# Patient Record
Sex: FEMALE
[De-identification: names, ages, dates, MRNs, and addresses within clinical notes are randomized per-mention and may not be internally consistent; named-entity substitution may affect disease eponyms.]

---

## 2020-12-28 ENCOUNTER — TRANSCRIPTION ENCOUNTER (OUTPATIENT)
Age: 30
End: 2020-12-28

## 2021-01-12 ENCOUNTER — EMERGENCY (EMERGENCY)
Facility: HOSPITAL | Age: 31
LOS: 1 days | Discharge: HOME | End: 2021-01-12
Attending: EMERGENCY MEDICINE | Admitting: EMERGENCY MEDICINE
Payer: COMMERCIAL

## 2021-01-12 ENCOUNTER — EMERGENCY (EMERGENCY)
Facility: HOSPITAL | Age: 31
LOS: 1 days | Discharge: HOME | End: 2021-01-12
Admitting: EMERGENCY MEDICINE
Payer: COMMERCIAL

## 2021-01-12 VITALS
HEART RATE: 69 BPM | OXYGEN SATURATION: 98 % | RESPIRATION RATE: 18 BRPM | SYSTOLIC BLOOD PRESSURE: 131 MMHG | TEMPERATURE: 99 F | DIASTOLIC BLOOD PRESSURE: 87 MMHG

## 2021-01-12 DIAGNOSIS — Z20.822 CONTACT WITH AND (SUSPECTED) EXPOSURE TO COVID-19: ICD-10-CM

## 2021-01-12 PROCEDURE — 99283 EMERGENCY DEPT VISIT LOW MDM: CPT

## 2021-01-12 PROCEDURE — L9981: CPT

## 2021-01-12 NOTE — ED PROVIDER NOTE - PATIENT PORTAL LINK FT
You can access the FollowMyHealth Patient Portal offered by [facility] by registering at the following website: [email or web address]. By joining [facility]’s FollowMyHealth portal, you will also be able to view your health information using other applications (apps) compatible with our system.

## 2021-01-12 NOTE — ED PROVIDER NOTE - NSFOLLOWUPINSTRUCTIONS_ED_ALL_ED_FT
Your test results may take 1-3 days. You will get a text/email.  Please check the patient online portal ([facility] and website) for results. You can create a portal account at [email or web address]. [facility] [place]. If you have old records with [employer] or [facility]  or encounter any difficulties with us you will need to call the HELP line to merge results [phone] (Mon-Fri 8a-5p).    Please follow the instructions on provided coronavirus discharge educational forms and if needed self quarantine for 14 days.     If you test positive for COVID 19:    1. STAY HOME for 14 DAYS  2. Minimize human contact to ONLY ESSENTIAL  3. Every time you wash your hands, sing the HAPPY BIRTHDAY song so you know you're washing long enough.  Make sure to scrub the webspace between your fingers.  4. DRINK 1-3 Liters of fluids day x at least 5 days.  To remain hydrated. Your fatigue, lightheadedness, and body aches will decrease and your fever has a better chance of breaking if you are well hydrated.    5. For your Fever and Body aches takes Tylenol 650-100mg every 4-6h (max 4000mg/day). Try not to use ibuprofen, aspirin or naproxen (Advil, Motrin or Aleve) as these may worsen Coronavirus infection.  6. Use an inhaler for mild shortness of breath and cough  7. RETURN TO THE ER IMMEDIATELY IF YOU HAVE WORSENING SHORTNESS OF BREATH  8. TAKE THE FOLLOWING SUPPLEMENTS DAILY.        VITAMIN C 1000MG ONCE DAILY.        VITAMIN D 200IU ONCE DAILY.        ZINC 50MG ONCE DAILY.

## 2021-01-12 NOTE — ED PROVIDER NOTE - CLINICAL SUMMARY MEDICAL DECISION MAKING FREE TEXT BOX
Asymptomatic patient here for COVID screening.  Risk of exposure is very low.  Reviewed vitals and testing plan with the patient.  Encouraged to download the follow my health shereen for test results.

## 2021-08-27 ENCOUNTER — TRANSCRIPTION ENCOUNTER (OUTPATIENT)
Age: 31
End: 2021-08-27

## 2021-10-05 ENCOUNTER — APPOINTMENT (OUTPATIENT)
Dept: ANTEPARTUM | Facility: CLINIC | Age: 31
End: 2021-10-05
Payer: COMMERCIAL

## 2021-10-05 ENCOUNTER — TRANSCRIPTION ENCOUNTER (OUTPATIENT)
Age: 31
End: 2021-10-05

## 2021-10-05 ENCOUNTER — ASOB RESULT (OUTPATIENT)
Age: 31
End: 2021-10-05

## 2021-10-05 PROBLEM — Z00.00 ENCOUNTER FOR PREVENTIVE HEALTH EXAMINATION: Status: ACTIVE | Noted: 2021-10-05

## 2021-10-05 PROCEDURE — 76813 OB US NUCHAL MEAS 1 GEST: CPT

## 2021-10-05 PROCEDURE — 76801 OB US < 14 WKS SINGLE FETUS: CPT

## 2021-10-13 ENCOUNTER — TRANSCRIPTION ENCOUNTER (OUTPATIENT)
Age: 31
End: 2021-10-13

## 2021-10-29 ENCOUNTER — TRANSCRIPTION ENCOUNTER (OUTPATIENT)
Age: 31
End: 2021-10-29

## 2021-11-24 ENCOUNTER — TRANSCRIPTION ENCOUNTER (OUTPATIENT)
Age: 31
End: 2021-11-24

## 2021-12-03 ENCOUNTER — ASOB RESULT (OUTPATIENT)
Age: 31
End: 2021-12-03

## 2021-12-03 ENCOUNTER — APPOINTMENT (OUTPATIENT)
Dept: ANTEPARTUM | Facility: CLINIC | Age: 31
End: 2021-12-03
Payer: COMMERCIAL

## 2021-12-03 PROCEDURE — 76805 OB US >/= 14 WKS SNGL FETUS: CPT

## 2021-12-03 PROCEDURE — 76817 TRANSVAGINAL US OBSTETRIC: CPT

## 2021-12-09 ENCOUNTER — TRANSCRIPTION ENCOUNTER (OUTPATIENT)
Age: 31
End: 2021-12-09

## 2022-01-28 ENCOUNTER — APPOINTMENT (OUTPATIENT)
Dept: ANTEPARTUM | Facility: CLINIC | Age: 32
End: 2022-01-28
Payer: COMMERCIAL

## 2022-01-28 ENCOUNTER — ASOB RESULT (OUTPATIENT)
Age: 32
End: 2022-01-28

## 2022-01-28 PROCEDURE — 76819 FETAL BIOPHYS PROFIL W/O NST: CPT

## 2022-01-28 PROCEDURE — 76816 OB US FOLLOW-UP PER FETUS: CPT

## 2022-02-25 ENCOUNTER — APPOINTMENT (OUTPATIENT)
Dept: ANTEPARTUM | Facility: CLINIC | Age: 32
End: 2022-02-25
Payer: COMMERCIAL

## 2022-02-25 ENCOUNTER — ASOB RESULT (OUTPATIENT)
Age: 32
End: 2022-02-25

## 2022-02-25 PROCEDURE — 76819 FETAL BIOPHYS PROFIL W/O NST: CPT

## 2022-02-25 PROCEDURE — 76816 OB US FOLLOW-UP PER FETUS: CPT

## 2022-03-10 RX ADMIN — Medication 30 MILLIGRAM(S): at 10:30

## 2022-03-11 ENCOUNTER — APPOINTMENT (OUTPATIENT)
Dept: ANTEPARTUM | Facility: CLINIC | Age: 32
End: 2022-03-11
Payer: COMMERCIAL

## 2022-03-11 ENCOUNTER — ASOB RESULT (OUTPATIENT)
Age: 32
End: 2022-03-11

## 2022-03-11 PROCEDURE — 76816 OB US FOLLOW-UP PER FETUS: CPT

## 2022-03-11 PROCEDURE — 76819 FETAL BIOPHYS PROFIL W/O NST: CPT

## 2022-03-15 ENCOUNTER — ASOB RESULT (OUTPATIENT)
Age: 32
End: 2022-03-15

## 2022-03-15 ENCOUNTER — APPOINTMENT (OUTPATIENT)
Dept: ANTEPARTUM | Facility: CLINIC | Age: 32
End: 2022-03-15
Payer: COMMERCIAL

## 2022-03-15 PROCEDURE — 76818 FETAL BIOPHYS PROFILE W/NST: CPT

## 2022-03-15 PROCEDURE — 76816 OB US FOLLOW-UP PER FETUS: CPT

## 2022-03-18 ENCOUNTER — APPOINTMENT (OUTPATIENT)
Dept: ANTEPARTUM | Facility: CLINIC | Age: 32
End: 2022-03-18
Payer: COMMERCIAL

## 2022-03-18 ENCOUNTER — ASOB RESULT (OUTPATIENT)
Age: 32
End: 2022-03-18

## 2022-03-18 PROCEDURE — 76819 FETAL BIOPHYS PROFIL W/O NST: CPT

## 2022-03-18 PROCEDURE — 76816 OB US FOLLOW-UP PER FETUS: CPT

## 2022-03-21 ENCOUNTER — ASOB RESULT (OUTPATIENT)
Age: 32
End: 2022-03-21

## 2022-03-21 ENCOUNTER — APPOINTMENT (OUTPATIENT)
Dept: ANTEPARTUM | Facility: CLINIC | Age: 32
End: 2022-03-21
Payer: COMMERCIAL

## 2022-03-21 PROCEDURE — 76818 FETAL BIOPHYS PROFILE W/NST: CPT

## 2022-03-24 ENCOUNTER — ASOB RESULT (OUTPATIENT)
Age: 32
End: 2022-03-24

## 2022-03-24 ENCOUNTER — APPOINTMENT (OUTPATIENT)
Dept: ANTEPARTUM | Facility: CLINIC | Age: 32
End: 2022-03-24
Payer: COMMERCIAL

## 2022-03-24 PROCEDURE — 76818 FETAL BIOPHYS PROFILE W/NST: CPT

## 2022-03-28 ENCOUNTER — APPOINTMENT (OUTPATIENT)
Dept: ANTEPARTUM | Facility: CLINIC | Age: 32
End: 2022-03-28
Payer: COMMERCIAL

## 2022-03-28 ENCOUNTER — ASOB RESULT (OUTPATIENT)
Age: 32
End: 2022-03-28

## 2022-03-28 ENCOUNTER — OUTPATIENT (OUTPATIENT)
Dept: OUTPATIENT SERVICES | Facility: HOSPITAL | Age: 32
LOS: 1 days | End: 2022-03-28
Payer: COMMERCIAL

## 2022-03-28 DIAGNOSIS — Z01.818 ENCOUNTER FOR OTHER PREPROCEDURAL EXAMINATION: ICD-10-CM

## 2022-03-28 LAB
ALBUMIN SERPL ELPH-MCNC: 3.9 G/DL — SIGNIFICANT CHANGE UP (ref 3.3–5)
ALP SERPL-CCNC: 165 U/L — HIGH (ref 40–120)
ALT FLD-CCNC: 22 U/L — SIGNIFICANT CHANGE UP (ref 10–45)
ANION GAP SERPL CALC-SCNC: 12 MMOL/L — SIGNIFICANT CHANGE UP (ref 5–17)
AST SERPL-CCNC: 25 U/L — SIGNIFICANT CHANGE UP (ref 10–40)
BILIRUB SERPL-MCNC: <0.2 MG/DL — SIGNIFICANT CHANGE UP (ref 0.2–1.2)
BLD GP AB SCN SERPL QL: NEGATIVE — SIGNIFICANT CHANGE UP
BLD GP AB SCN SERPL QL: NEGATIVE — SIGNIFICANT CHANGE UP
BUN SERPL-MCNC: 11 MG/DL — SIGNIFICANT CHANGE UP (ref 7–23)
CALCIUM SERPL-MCNC: 9.6 MG/DL — SIGNIFICANT CHANGE UP (ref 8.4–10.5)
CHLORIDE SERPL-SCNC: 102 MMOL/L — SIGNIFICANT CHANGE UP (ref 96–108)
CO2 SERPL-SCNC: 21 MMOL/L — LOW (ref 22–31)
CREAT SERPL-MCNC: 0.57 MG/DL — SIGNIFICANT CHANGE UP (ref 0.5–1.3)
EGFR: 123 ML/MIN/1.73M2 — SIGNIFICANT CHANGE UP
GLUCOSE SERPL-MCNC: 98 MG/DL — SIGNIFICANT CHANGE UP (ref 70–99)
HCT VFR BLD CALC: 36.5 % — SIGNIFICANT CHANGE UP (ref 34.5–45)
HGB BLD-MCNC: 12.6 G/DL — SIGNIFICANT CHANGE UP (ref 11.5–15.5)
MCHC RBC-ENTMCNC: 30.3 PG — SIGNIFICANT CHANGE UP (ref 27–34)
MCHC RBC-ENTMCNC: 34.5 GM/DL — SIGNIFICANT CHANGE UP (ref 32–36)
MCV RBC AUTO: 87.7 FL — SIGNIFICANT CHANGE UP (ref 80–100)
NRBC # BLD: 0 /100 WBCS — SIGNIFICANT CHANGE UP (ref 0–0)
PLATELET # BLD AUTO: 318 K/UL — SIGNIFICANT CHANGE UP (ref 150–400)
POTASSIUM SERPL-MCNC: 4.7 MMOL/L — SIGNIFICANT CHANGE UP (ref 3.5–5.3)
POTASSIUM SERPL-SCNC: 4.7 MMOL/L — SIGNIFICANT CHANGE UP (ref 3.5–5.3)
PROT SERPL-MCNC: 6.3 G/DL — SIGNIFICANT CHANGE UP (ref 6–8.3)
RBC # BLD: 4.16 M/UL — SIGNIFICANT CHANGE UP (ref 3.8–5.2)
RBC # FLD: 12.7 % — SIGNIFICANT CHANGE UP (ref 10.3–14.5)
RH IG SCN BLD-IMP: POSITIVE — SIGNIFICANT CHANGE UP
RH IG SCN BLD-IMP: POSITIVE — SIGNIFICANT CHANGE UP
SODIUM SERPL-SCNC: 135 MMOL/L — SIGNIFICANT CHANGE UP (ref 135–145)
T PALLIDUM AB TITR SER: NEGATIVE — SIGNIFICANT CHANGE UP
WBC # BLD: 11.9 K/UL — HIGH (ref 3.8–10.5)
WBC # FLD AUTO: 11.9 K/UL — HIGH (ref 3.8–10.5)

## 2022-03-28 PROCEDURE — 76818 FETAL BIOPHYS PROFILE W/NST: CPT

## 2022-03-29 ENCOUNTER — TRANSCRIPTION ENCOUNTER (OUTPATIENT)
Age: 32
End: 2022-03-29

## 2022-03-30 ENCOUNTER — RESULT REVIEW (OUTPATIENT)
Age: 32
End: 2022-03-30

## 2022-03-30 ENCOUNTER — INPATIENT (INPATIENT)
Facility: HOSPITAL | Age: 32
LOS: 1 days | Discharge: HOME | End: 2022-04-01
Attending: OBSTETRICS & GYNECOLOGY | Admitting: OBSTETRICS & GYNECOLOGY
Payer: COMMERCIAL

## 2022-03-30 VITALS — HEIGHT: 63 IN | WEIGHT: 202.38 LBS

## 2022-03-30 LAB
ALBUMIN SERPL ELPH-MCNC: 3.8 G/DL — SIGNIFICANT CHANGE UP (ref 3.3–5)
ALP SERPL-CCNC: 159 U/L — HIGH (ref 40–120)
ALT FLD-CCNC: 21 U/L — SIGNIFICANT CHANGE UP (ref 10–45)
ANION GAP SERPL CALC-SCNC: 12 MMOL/L — SIGNIFICANT CHANGE UP (ref 5–17)
APPEARANCE UR: CLEAR — SIGNIFICANT CHANGE UP
APTT BLD: 26.6 SEC — LOW (ref 27.5–35.5)
AST SERPL-CCNC: 25 U/L — SIGNIFICANT CHANGE UP (ref 10–40)
BACTERIA # UR AUTO: PRESENT /HPF
BASOPHILS # BLD AUTO: 0.02 K/UL — SIGNIFICANT CHANGE UP (ref 0–0.2)
BASOPHILS NFR BLD AUTO: 0.2 % — SIGNIFICANT CHANGE UP (ref 0–2)
BILIRUB SERPL-MCNC: 0.2 MG/DL — SIGNIFICANT CHANGE UP (ref 0.2–1.2)
BILIRUB UR-MCNC: NEGATIVE — SIGNIFICANT CHANGE UP
BLD GP AB SCN SERPL QL: NEGATIVE — SIGNIFICANT CHANGE UP
BUN SERPL-MCNC: 9 MG/DL — SIGNIFICANT CHANGE UP (ref 7–23)
CALCIUM SERPL-MCNC: 9.5 MG/DL — SIGNIFICANT CHANGE UP (ref 8.4–10.5)
CHLORIDE SERPL-SCNC: 102 MMOL/L — SIGNIFICANT CHANGE UP (ref 96–108)
CO2 SERPL-SCNC: 19 MMOL/L — LOW (ref 22–31)
COLOR SPEC: YELLOW — SIGNIFICANT CHANGE UP
COVID-19 SPIKE DOMAIN AB INTERP: POSITIVE
COVID-19 SPIKE DOMAIN ANTIBODY RESULT: >250 U/ML — HIGH
CREAT ?TM UR-MCNC: 30 MG/DL — SIGNIFICANT CHANGE UP
CREAT SERPL-MCNC: 0.53 MG/DL — SIGNIFICANT CHANGE UP (ref 0.5–1.3)
DIFF PNL FLD: ABNORMAL
EGFR: 125 ML/MIN/1.73M2 — SIGNIFICANT CHANGE UP
EOSINOPHIL # BLD AUTO: 0 K/UL — SIGNIFICANT CHANGE UP (ref 0–0.5)
EOSINOPHIL NFR BLD AUTO: 0 % — SIGNIFICANT CHANGE UP (ref 0–6)
EPI CELLS # UR: SIGNIFICANT CHANGE UP /HPF (ref 0–5)
FIBRINOGEN PPP-MCNC: 436 MG/DL — SIGNIFICANT CHANGE UP (ref 258–438)
GLUCOSE SERPL-MCNC: 94 MG/DL — SIGNIFICANT CHANGE UP (ref 70–99)
GLUCOSE UR QL: NEGATIVE — SIGNIFICANT CHANGE UP
HCT VFR BLD CALC: 34.9 % — SIGNIFICANT CHANGE UP (ref 34.5–45)
HGB BLD-MCNC: 11.9 G/DL — SIGNIFICANT CHANGE UP (ref 11.5–15.5)
IMM GRANULOCYTES NFR BLD AUTO: 0.8 % — SIGNIFICANT CHANGE UP (ref 0–1.5)
INR BLD: 0.91 — SIGNIFICANT CHANGE UP (ref 0.88–1.16)
KETONES UR-MCNC: ABNORMAL MG/DL
LDH SERPL L TO P-CCNC: 257 U/L — HIGH (ref 50–242)
LEUKOCYTE ESTERASE UR-ACNC: NEGATIVE — SIGNIFICANT CHANGE UP
LYMPHOCYTES # BLD AUTO: 1.7 K/UL — SIGNIFICANT CHANGE UP (ref 1–3.3)
LYMPHOCYTES # BLD AUTO: 14.3 % — SIGNIFICANT CHANGE UP (ref 13–44)
MCHC RBC-ENTMCNC: 29.2 PG — SIGNIFICANT CHANGE UP (ref 27–34)
MCHC RBC-ENTMCNC: 34.1 GM/DL — SIGNIFICANT CHANGE UP (ref 32–36)
MCV RBC AUTO: 85.7 FL — SIGNIFICANT CHANGE UP (ref 80–100)
MONOCYTES # BLD AUTO: 0.76 K/UL — SIGNIFICANT CHANGE UP (ref 0–0.9)
MONOCYTES NFR BLD AUTO: 6.4 % — SIGNIFICANT CHANGE UP (ref 2–14)
NEUTROPHILS # BLD AUTO: 9.28 K/UL — HIGH (ref 1.8–7.4)
NEUTROPHILS NFR BLD AUTO: 78.3 % — HIGH (ref 43–77)
NITRITE UR-MCNC: NEGATIVE — SIGNIFICANT CHANGE UP
NRBC # BLD: 0 /100 WBCS — SIGNIFICANT CHANGE UP (ref 0–0)
PH UR: 7.5 — SIGNIFICANT CHANGE UP (ref 5–8)
PLATELET # BLD AUTO: 278 K/UL — SIGNIFICANT CHANGE UP (ref 150–400)
POTASSIUM SERPL-MCNC: 4 MMOL/L — SIGNIFICANT CHANGE UP (ref 3.5–5.3)
POTASSIUM SERPL-SCNC: 4 MMOL/L — SIGNIFICANT CHANGE UP (ref 3.5–5.3)
PROT ?TM UR-MCNC: 11 MG/DL — SIGNIFICANT CHANGE UP (ref 0–12)
PROT SERPL-MCNC: 6.7 G/DL — SIGNIFICANT CHANGE UP (ref 6–8.3)
PROT UR-MCNC: NEGATIVE MG/DL — SIGNIFICANT CHANGE UP
PROT/CREAT UR-RTO: 0.4 RATIO — HIGH (ref 0–0.2)
PROTHROM AB SERPL-ACNC: 10.8 SEC — SIGNIFICANT CHANGE UP (ref 10.5–13.4)
RBC # BLD: 4.07 M/UL — SIGNIFICANT CHANGE UP (ref 3.8–5.2)
RBC # FLD: 12.5 % — SIGNIFICANT CHANGE UP (ref 10.3–14.5)
RBC CASTS # UR COMP ASSIST: > 10 /HPF
RH IG SCN BLD-IMP: POSITIVE — SIGNIFICANT CHANGE UP
SARS-COV-2 IGG+IGM SERPL QL IA: >250 U/ML — HIGH
SARS-COV-2 IGG+IGM SERPL QL IA: POSITIVE
SODIUM SERPL-SCNC: 133 MMOL/L — LOW (ref 135–145)
SP GR SPEC: 1.02 — SIGNIFICANT CHANGE UP (ref 1–1.03)
T PALLIDUM AB TITR SER: NEGATIVE — SIGNIFICANT CHANGE UP
URATE SERPL-MCNC: 6.1 MG/DL — SIGNIFICANT CHANGE UP (ref 2.5–7)
UROBILINOGEN FLD QL: 0.2 E.U./DL — SIGNIFICANT CHANGE UP
WBC # BLD: 11.86 K/UL — HIGH (ref 3.8–10.5)
WBC # FLD AUTO: 11.86 K/UL — HIGH (ref 3.8–10.5)
WBC UR QL: < 5 /HPF — SIGNIFICANT CHANGE UP

## 2022-03-30 PROCEDURE — 88307 TISSUE EXAM BY PATHOLOGIST: CPT | Mod: 26

## 2022-03-30 RX ORDER — SIMETHICONE 80 MG/1
80 TABLET, CHEWABLE ORAL EVERY 4 HOURS
Refills: 0 | Status: DISCONTINUED | OUTPATIENT
Start: 2022-03-30 | End: 2022-04-01

## 2022-03-30 RX ORDER — DEXAMETHASONE 0.5 MG/5ML
4 ELIXIR ORAL EVERY 6 HOURS
Refills: 0 | Status: DISCONTINUED | OUTPATIENT
Start: 2022-03-30 | End: 2022-03-30

## 2022-03-30 RX ORDER — IBUPROFEN 200 MG
600 TABLET ORAL EVERY 6 HOURS
Refills: 0 | Status: COMPLETED | OUTPATIENT
Start: 2022-03-30 | End: 2023-02-26

## 2022-03-30 RX ORDER — SODIUM CHLORIDE 9 MG/ML
1000 INJECTION, SOLUTION INTRAVENOUS
Refills: 0 | Status: DISCONTINUED | OUTPATIENT
Start: 2022-03-30 | End: 2022-03-30

## 2022-03-30 RX ORDER — OXYTOCIN 10 UNIT/ML
333.33 VIAL (ML) INJECTION
Qty: 20 | Refills: 0 | Status: DISCONTINUED | OUTPATIENT
Start: 2022-03-30 | End: 2022-04-01

## 2022-03-30 RX ORDER — LANOLIN
1 OINTMENT (GRAM) TOPICAL EVERY 6 HOURS
Refills: 0 | Status: DISCONTINUED | OUTPATIENT
Start: 2022-03-30 | End: 2022-04-01

## 2022-03-30 RX ORDER — CEFAZOLIN SODIUM 1 G
2000 VIAL (EA) INJECTION ONCE
Refills: 0 | Status: COMPLETED | OUTPATIENT
Start: 2022-03-30 | End: 2022-03-30

## 2022-03-30 RX ORDER — FAMOTIDINE 10 MG/ML
20 INJECTION INTRAVENOUS ONCE
Refills: 0 | Status: COMPLETED | OUTPATIENT
Start: 2022-03-30 | End: 2022-03-30

## 2022-03-30 RX ORDER — ENOXAPARIN SODIUM 100 MG/ML
40 INJECTION SUBCUTANEOUS EVERY 12 HOURS
Refills: 0 | Status: DISCONTINUED | OUTPATIENT
Start: 2022-03-31 | End: 2022-04-01

## 2022-03-30 RX ORDER — OXYCODONE HYDROCHLORIDE 5 MG/1
5 TABLET ORAL ONCE
Refills: 0 | Status: DISCONTINUED | OUTPATIENT
Start: 2022-03-30 | End: 2022-04-01

## 2022-03-30 RX ORDER — SODIUM CHLORIDE 9 MG/ML
1000 INJECTION, SOLUTION INTRAVENOUS
Refills: 0 | Status: DISCONTINUED | OUTPATIENT
Start: 2022-03-30 | End: 2022-04-01

## 2022-03-30 RX ORDER — NALOXONE HYDROCHLORIDE 4 MG/.1ML
0.1 SPRAY NASAL
Refills: 0 | Status: DISCONTINUED | OUTPATIENT
Start: 2022-03-30 | End: 2022-03-30

## 2022-03-30 RX ORDER — SODIUM CHLORIDE 9 MG/ML
1000 INJECTION, SOLUTION INTRAVENOUS ONCE
Refills: 0 | Status: COMPLETED | OUTPATIENT
Start: 2022-03-30 | End: 2022-03-30

## 2022-03-30 RX ORDER — OXYTOCIN 10 UNIT/ML
333.33 VIAL (ML) INJECTION
Qty: 20 | Refills: 0 | Status: DISCONTINUED | OUTPATIENT
Start: 2022-03-30 | End: 2022-03-30

## 2022-03-30 RX ORDER — ACETAMINOPHEN 500 MG
975 TABLET ORAL
Refills: 0 | Status: DISCONTINUED | OUTPATIENT
Start: 2022-03-30 | End: 2022-04-01

## 2022-03-30 RX ORDER — DIPHENHYDRAMINE HCL 50 MG
25 CAPSULE ORAL EVERY 6 HOURS
Refills: 0 | Status: DISCONTINUED | OUTPATIENT
Start: 2022-03-30 | End: 2022-04-01

## 2022-03-30 RX ORDER — CITRIC ACID/SODIUM CITRATE 300-500 MG
30 SOLUTION, ORAL ORAL ONCE
Refills: 0 | Status: COMPLETED | OUTPATIENT
Start: 2022-03-30 | End: 2022-03-30

## 2022-03-30 RX ORDER — MAGNESIUM HYDROXIDE 400 MG/1
30 TABLET, CHEWABLE ORAL
Refills: 0 | Status: DISCONTINUED | OUTPATIENT
Start: 2022-03-30 | End: 2022-04-01

## 2022-03-30 RX ORDER — OXYCODONE HYDROCHLORIDE 5 MG/1
5 TABLET ORAL
Refills: 0 | Status: DISCONTINUED | OUTPATIENT
Start: 2022-03-30 | End: 2022-04-01

## 2022-03-30 RX ORDER — KETOROLAC TROMETHAMINE 30 MG/ML
30 SYRINGE (ML) INJECTION EVERY 6 HOURS
Refills: 0 | Status: DISCONTINUED | OUTPATIENT
Start: 2022-03-30 | End: 2022-03-31

## 2022-03-30 RX ORDER — ONDANSETRON 8 MG/1
4 TABLET, FILM COATED ORAL EVERY 6 HOURS
Refills: 0 | Status: DISCONTINUED | OUTPATIENT
Start: 2022-03-30 | End: 2022-03-30

## 2022-03-30 RX ORDER — TETANUS TOXOID, REDUCED DIPHTHERIA TOXOID AND ACELLULAR PERTUSSIS VACCINE, ADSORBED 5; 2.5; 8; 8; 2.5 [IU]/.5ML; [IU]/.5ML; UG/.5ML; UG/.5ML; UG/.5ML
0.5 SUSPENSION INTRAMUSCULAR ONCE
Refills: 0 | Status: DISCONTINUED | OUTPATIENT
Start: 2022-03-30 | End: 2022-04-01

## 2022-03-30 RX ADMIN — Medication 100 MILLIGRAM(S): at 11:10

## 2022-03-30 RX ADMIN — Medication 30 MILLIGRAM(S): at 21:37

## 2022-03-30 RX ADMIN — Medication 975 MILLIGRAM(S): at 23:52

## 2022-03-30 RX ADMIN — FAMOTIDINE 20 MILLIGRAM(S): 10 INJECTION INTRAVENOUS at 11:15

## 2022-03-30 RX ADMIN — SIMETHICONE 80 MILLIGRAM(S): 80 TABLET, CHEWABLE ORAL at 21:37

## 2022-03-30 RX ADMIN — Medication 30 MILLIGRAM(S): at 22:30

## 2022-03-30 RX ADMIN — Medication 1000 MILLIUNIT(S)/MIN: at 12:05

## 2022-03-30 RX ADMIN — SODIUM CHLORIDE 2000 MILLILITER(S): 9 INJECTION, SOLUTION INTRAVENOUS at 10:00

## 2022-03-30 RX ADMIN — Medication 30 MILLIGRAM(S): at 16:15

## 2022-03-30 RX ADMIN — SODIUM CHLORIDE 125 MILLILITER(S): 9 INJECTION, SOLUTION INTRAVENOUS at 21:46

## 2022-03-30 RX ADMIN — Medication 30 MILLILITER(S): at 11:17

## 2022-03-30 RX ADMIN — Medication 975 MILLIGRAM(S): at 19:30

## 2022-03-30 RX ADMIN — Medication 30 MILLIGRAM(S): at 15:36

## 2022-03-30 RX ADMIN — Medication 975 MILLIGRAM(S): at 18:48

## 2022-03-30 NOTE — LACTATION INITIAL EVALUATION - NS LACT CON REASON FOR REQ
37 wk gestation baby girl, seen around 6 hrs of life. Voided x2, still dtm. Baby mucousy, has not yet latched to breast. Baby placed skin to skin on mothers chest, burps offered. Mother with large breasts, right nipple everted, left nipple flat/ short. Attempts were made to latch baby to left side but baby pushing away unable to sustain. Hand pump given to help roberto left nipple prior to latch attempts. Manual expression technique and spoon feeding was taught with proper return demo. Colostrum easily expressible and 1 full tsp collected and slowly fed to baby by myself and the mother, then baby placed back to breastfeed. After several attempts and with full assist, baby was finally able to deeply latch to the right breast in football hold, fed ~5 minutes with an organized, nutritive suck, then released breast crying, relatched again for another 2 minutes and fell asleep. Mother to continue with frequent feeds, and manually express left breast and spoon feed to baby if baby continues to have difficulty latching to that side. Mother made aware of lactation availability and to call for further assistance as needed. Increased SSC and rooming-in encouraged. All questions were answered, mother verbalized understanding of teaching and info given. Referral for lactation telehealth was placed for further support s/p d/c./primaparous mom

## 2022-03-30 NOTE — LACTATION INITIAL EVALUATION - LACTATION INTERVENTIONS
initiate/review safe skin-to-skin/initiate/review hand expression/initiate/review techniques for position and latch/post discharge community resources provided/initiate/review finger suck/reviewed components of an effective feeding and at least 8 effective feedings per day required/reviewed importance of monitoring infant diapers, the breastfeeding log, and minimum output each day/reviewed benefits and recommendations for rooming in/reviewed feeding on demand/by cue at least 8 times a day

## 2022-03-30 NOTE — PATIENT PROFILE OB - FALL HARM RISK - UNIVERSAL INTERVENTIONS
Bed in lowest position, wheels locked, appropriate side rails in place/Instruct patient to call for assistance before getting out of bed or chair/Non-slip footwear when patient is out of bed/[place]er to call system/Physically safe environment - no spills, clutter or unnecessary equipment/Purposeful Proactive Rounding/Room/bathroom lighting operational, light cord in reach

## 2022-03-31 LAB
ALBUMIN SERPL ELPH-MCNC: 2.9 G/DL — LOW (ref 3.3–5)
ALP SERPL-CCNC: 112 U/L — SIGNIFICANT CHANGE UP (ref 40–120)
ALT FLD-CCNC: 15 U/L — SIGNIFICANT CHANGE UP (ref 10–45)
ANION GAP SERPL CALC-SCNC: 10 MMOL/L — SIGNIFICANT CHANGE UP (ref 5–17)
AST SERPL-CCNC: 25 U/L — SIGNIFICANT CHANGE UP (ref 10–40)
BASOPHILS # BLD AUTO: 0.02 K/UL — SIGNIFICANT CHANGE UP (ref 0–0.2)
BASOPHILS NFR BLD AUTO: 0.1 % — SIGNIFICANT CHANGE UP (ref 0–2)
BILIRUB SERPL-MCNC: 0.2 MG/DL — SIGNIFICANT CHANGE UP (ref 0.2–1.2)
BUN SERPL-MCNC: 8 MG/DL — SIGNIFICANT CHANGE UP (ref 7–23)
CALCIUM SERPL-MCNC: 9 MG/DL — SIGNIFICANT CHANGE UP (ref 8.4–10.5)
CHLORIDE SERPL-SCNC: 105 MMOL/L — SIGNIFICANT CHANGE UP (ref 96–108)
CO2 SERPL-SCNC: 21 MMOL/L — LOW (ref 22–31)
CREAT SERPL-MCNC: 0.59 MG/DL — SIGNIFICANT CHANGE UP (ref 0.5–1.3)
EGFR: 122 ML/MIN/1.73M2 — SIGNIFICANT CHANGE UP
EOSINOPHIL # BLD AUTO: 0 K/UL — SIGNIFICANT CHANGE UP (ref 0–0.5)
EOSINOPHIL NFR BLD AUTO: 0 % — SIGNIFICANT CHANGE UP (ref 0–6)
GLUCOSE SERPL-MCNC: 113 MG/DL — HIGH (ref 70–99)
HCT VFR BLD CALC: 28.2 % — LOW (ref 34.5–45)
HGB BLD-MCNC: 9.7 G/DL — LOW (ref 11.5–15.5)
IMM GRANULOCYTES NFR BLD AUTO: 0.5 % — SIGNIFICANT CHANGE UP (ref 0–1.5)
LYMPHOCYTES # BLD AUTO: 1.93 K/UL — SIGNIFICANT CHANGE UP (ref 1–3.3)
LYMPHOCYTES # BLD AUTO: 12.3 % — LOW (ref 13–44)
MCHC RBC-ENTMCNC: 29.6 PG — SIGNIFICANT CHANGE UP (ref 27–34)
MCHC RBC-ENTMCNC: 34.4 GM/DL — SIGNIFICANT CHANGE UP (ref 32–36)
MCV RBC AUTO: 86 FL — SIGNIFICANT CHANGE UP (ref 80–100)
MONOCYTES # BLD AUTO: 1.35 K/UL — HIGH (ref 0–0.9)
MONOCYTES NFR BLD AUTO: 8.6 % — SIGNIFICANT CHANGE UP (ref 2–14)
NEUTROPHILS # BLD AUTO: 12.34 K/UL — HIGH (ref 1.8–7.4)
NEUTROPHILS NFR BLD AUTO: 78.5 % — HIGH (ref 43–77)
NRBC # BLD: 0 /100 WBCS — SIGNIFICANT CHANGE UP (ref 0–0)
PLATELET # BLD AUTO: 225 K/UL — SIGNIFICANT CHANGE UP (ref 150–400)
POTASSIUM SERPL-MCNC: 3.7 MMOL/L — SIGNIFICANT CHANGE UP (ref 3.5–5.3)
POTASSIUM SERPL-SCNC: 3.7 MMOL/L — SIGNIFICANT CHANGE UP (ref 3.5–5.3)
PROT SERPL-MCNC: 5.5 G/DL — LOW (ref 6–8.3)
RBC # BLD: 3.28 M/UL — LOW (ref 3.8–5.2)
RBC # FLD: 12.4 % — SIGNIFICANT CHANGE UP (ref 10.3–14.5)
SODIUM SERPL-SCNC: 136 MMOL/L — SIGNIFICANT CHANGE UP (ref 135–145)
WBC # BLD: 15.72 K/UL — HIGH (ref 3.8–10.5)
WBC # FLD AUTO: 15.72 K/UL — HIGH (ref 3.8–10.5)

## 2022-03-31 RX ORDER — IBUPROFEN 200 MG
600 TABLET ORAL EVERY 6 HOURS
Refills: 0 | Status: DISCONTINUED | OUTPATIENT
Start: 2022-03-31 | End: 2022-04-01

## 2022-03-31 RX ADMIN — Medication 600 MILLIGRAM(S): at 22:29

## 2022-03-31 RX ADMIN — Medication 975 MILLIGRAM(S): at 18:09

## 2022-03-31 RX ADMIN — SIMETHICONE 80 MILLIGRAM(S): 80 TABLET, CHEWABLE ORAL at 18:09

## 2022-03-31 RX ADMIN — Medication 30 MILLIGRAM(S): at 09:31

## 2022-03-31 RX ADMIN — Medication 975 MILLIGRAM(S): at 12:12

## 2022-03-31 RX ADMIN — Medication 975 MILLIGRAM(S): at 18:39

## 2022-03-31 RX ADMIN — Medication 975 MILLIGRAM(S): at 00:45

## 2022-03-31 RX ADMIN — Medication 30 MILLIGRAM(S): at 03:38

## 2022-03-31 RX ADMIN — Medication 975 MILLIGRAM(S): at 06:00

## 2022-03-31 RX ADMIN — Medication 1 APPLICATION(S): at 09:30

## 2022-03-31 RX ADMIN — Medication 975 MILLIGRAM(S): at 07:00

## 2022-03-31 RX ADMIN — ENOXAPARIN SODIUM 40 MILLIGRAM(S): 100 INJECTION SUBCUTANEOUS at 18:09

## 2022-03-31 RX ADMIN — Medication 600 MILLIGRAM(S): at 21:48

## 2022-03-31 RX ADMIN — ENOXAPARIN SODIUM 40 MILLIGRAM(S): 100 INJECTION SUBCUTANEOUS at 05:59

## 2022-03-31 RX ADMIN — Medication 975 MILLIGRAM(S): at 12:45

## 2022-03-31 RX ADMIN — Medication 30 MILLIGRAM(S): at 04:13

## 2022-04-01 ENCOUNTER — TRANSCRIPTION ENCOUNTER (OUTPATIENT)
Age: 32
End: 2022-04-01

## 2022-04-01 VITALS
RESPIRATION RATE: 18 BRPM | DIASTOLIC BLOOD PRESSURE: 80 MMHG | SYSTOLIC BLOOD PRESSURE: 126 MMHG | OXYGEN SATURATION: 98 % | HEART RATE: 78 BPM | TEMPERATURE: 99 F

## 2022-04-01 RX ORDER — ACETAMINOPHEN 500 MG
3 TABLET ORAL
Qty: 0 | Refills: 0 | DISCHARGE
Start: 2022-04-01

## 2022-04-01 RX ORDER — IBUPROFEN 200 MG
1 TABLET ORAL
Qty: 0 | Refills: 0 | DISCHARGE
Start: 2022-04-01

## 2022-04-01 RX ADMIN — ENOXAPARIN SODIUM 40 MILLIGRAM(S): 100 INJECTION SUBCUTANEOUS at 06:37

## 2022-04-01 RX ADMIN — Medication 975 MILLIGRAM(S): at 07:10

## 2022-04-01 RX ADMIN — Medication 975 MILLIGRAM(S): at 00:47

## 2022-04-01 RX ADMIN — Medication 600 MILLIGRAM(S): at 08:38

## 2022-04-01 RX ADMIN — Medication 600 MILLIGRAM(S): at 04:00

## 2022-04-01 RX ADMIN — SIMETHICONE 80 MILLIGRAM(S): 80 TABLET, CHEWABLE ORAL at 00:47

## 2022-04-01 RX ADMIN — Medication 600 MILLIGRAM(S): at 03:09

## 2022-04-01 RX ADMIN — Medication 600 MILLIGRAM(S): at 09:30

## 2022-04-01 RX ADMIN — SIMETHICONE 80 MILLIGRAM(S): 80 TABLET, CHEWABLE ORAL at 06:52

## 2022-04-01 RX ADMIN — Medication 975 MILLIGRAM(S): at 11:45

## 2022-04-01 RX ADMIN — MAGNESIUM HYDROXIDE 30 MILLILITER(S): 400 TABLET, CHEWABLE ORAL at 06:52

## 2022-04-01 RX ADMIN — Medication 975 MILLIGRAM(S): at 01:30

## 2022-04-01 RX ADMIN — Medication 975 MILLIGRAM(S): at 06:37

## 2022-04-01 NOTE — DISCHARGE NOTE OB - NS MD DC FALL RISK RISK
For information on Fall & Injury Prevention, visit: [email or web address] OR  [email or web address] OR  https://www.cdc.gov/steadi/patient.html

## 2022-04-01 NOTE — DISCHARGE NOTE OB - PATIENT PORTAL LINK FT
You can access the FollowMyHealth Patient Portal offered by [facility] by registering at the following website: [email or web address]. By joining [employer]’s FollowMyHealth portal, you will also be able to view your health information using other applications (apps) compatible with our system.

## 2022-04-01 NOTE — DISCHARGE NOTE OB - HOSPITAL COURSE
pt s/p pC/S for breech presentation c/b sipec w/o SF, uncomplicated pp course, meeting pp milestones, stable for d/c

## 2022-04-01 NOTE — DISCHARGE NOTE OB - CARE PROVIDER_API CALL
Evangelina Joe (DO; MS)  [facility]  [address] [place] [address]  Phone: [phone]  Fax: [phone]  Follow Up Time:

## 2022-04-01 NOTE — DISCHARGE NOTE OB - CARE PLAN
Principal Discharge DX:	Postpartum state  Assessment and plan of treatment:	 delivery, meeting all postoperative milestones.  Please follow-up with your OB doctor within 1-2 weeks.  You can resume a regular diet at home and may continue your prenatal vitamins as directed.  Please place nothing in the vagina for 6 weeks (no tampons, sex, douching, tub baths, swimming pools, etc).  If you have severe headaches and/or vision changes, heavy bleeding, or chest pain, please call your provider or go to the nearest Emergency Department.  Please call your OB with any signs of symptoms of infection including fever > 100.4 degrees, severe pain, malodorous vaginal discharge or heavy bleeding requiring more than 1-2 pads/hour.  You can take Motrin 600mg orally every 6 hours for pain as needed.  Secondary Diagnosis:	Chronic hypertension with superimposed preeclampsia  Assessment and plan of treatment:	Monitor blood pressure twice daily.  Document blood pressures to review with obstetrician at follow-up appointment.  Call doctor for persistent systolic blood pressure (top number) equal or greater to 150 AND/OR diastolic blood pressure (bottom number) equal or above 100.      Return to hospital for systolic blood pressure (top number) equal to or greater than 160 AND/OR diastolic blood pressure (bottom number) equal to or greater than 110 OR any of the following symptoms: changes in vision, headache not relieved with Tylenol, severe abdominal pain, vomiting, increased vaginal bleeding, chest pain, or shortness of breath.   1

## 2022-04-01 NOTE — PROGRESS NOTE ADULT - SUBJECTIVE AND OBJECTIVE BOX
Patient evaluated at bedside this morning, resting comfortable in bed.   She reports pain is well controlled.  She denies headache, dizziness, chest pain, palpitations, shortness of breathe, nausea, vomiting or heavy vaginal bleeding.  She has been ambulating without assistance, voiding spontaneously, passing gas, tolerating regular diet and is breastfeeding.    Physical Exam:  Vital Signs Last 24 Hrs  T(C): 37 (01 Apr 2022 06:04), Max: 37 (01 Apr 2022 06:04)  T(F): 98.6 (01 Apr 2022 06:04), Max: 98.6 (01 Apr 2022 06:04)  HR: 78 (01 Apr 2022 06:04) (78 - 88)  BP: 126/80 (01 Apr 2022 06:04) (117/75 - 126/80)  BP(mean): --  RR: 18 (01 Apr 2022 06:04) (17 - 18)  SpO2: 98% (01 Apr 2022 06:04) (97% - 98%)    GA: NAD, A+0 x 3  Abd: + BS, soft, nontender, nondistended, no rebound or guarding, incision clean, dry and intact, uterus firm at midline and below umbilicus  : lochia WNL  Extremities: no swelling or calf tenderness                             9.7    15.72 )-----------( 225      ( 31 Mar 2022 06:34 )             28.2     03-31    136  |  105  |  8   ----------------------------<  113<H>  3.7   |  21<L>  |  0.59    Ca    9.0      31 Mar 2022 10:25    TPro  5.5<L>  /  Alb  2.9<L>  /  TBili  0.2  /  DBili  x   /  AST  25  /  ALT  15  /  AlkPhos  112  03-31      PT/INR - ( 30 Mar 2022 10:34 )   PT: 10.8 sec;   INR: 0.91          PTT - ( 30 Mar 2022 10:34 )  PTT:26.6 sec  
POD1, donig well. OOB. Pending void.   Precautions given.  Plan for dc tomorrow

## 2022-04-01 NOTE — PROGRESS NOTE ADULT - ASSESSMENT
A/P: 31y s/p  section c/b siPEC w/SF, POD #2, stable  - siPEC w/ SF: denies all toxic symptoms, normotensive, ctm  -  Pain: PO motrin/acetaminophen, oxycodone for severe pain PRN  -  Post-operatively labs: post-op Hgb stable  -  GI: tolerating regular diet, passing gas  -  : voiding spontaneously  -  DVT prophylaxis: ambulation, SCDs, Lovenox  -  Dispo: POD 3 or 4

## 2022-04-05 DIAGNOSIS — O32.2XX0 MATERNAL CARE FOR TRANSVERSE AND OBLIQUE LIE, NOT APPLICABLE OR UNSPECIFIED: ICD-10-CM

## 2022-04-05 DIAGNOSIS — Z3A.37 37 WEEKS GESTATION OF PREGNANCY: ICD-10-CM

## 2022-04-05 DIAGNOSIS — Z34.83 ENCOUNTER FOR SUPERVISION OF OTHER NORMAL PREGNANCY, THIRD TRIMESTER: ICD-10-CM

## 2022-04-07 LAB — SURGICAL PATHOLOGY STUDY: SIGNIFICANT CHANGE UP

## 2022-04-11 ENCOUNTER — NON-APPOINTMENT (OUTPATIENT)
Age: 32
End: 2022-04-11

## 2023-01-09 ENCOUNTER — NON-APPOINTMENT (OUTPATIENT)
Age: 33
End: 2023-01-09

## 2023-01-09 ENCOUNTER — APPOINTMENT (OUTPATIENT)
Dept: ENDOCRINOLOGY | Facility: CLINIC | Age: 33
End: 2023-01-09
Payer: COMMERCIAL

## 2023-01-09 VITALS
BODY MASS INDEX: 29.95 KG/M2 | HEIGHT: 63 IN | DIASTOLIC BLOOD PRESSURE: 83 MMHG | SYSTOLIC BLOOD PRESSURE: 124 MMHG | WEIGHT: 169 LBS | HEART RATE: 63 BPM

## 2023-01-09 PROCEDURE — 99204 OFFICE O/P NEW MOD 45 MIN: CPT

## 2023-01-09 NOTE — PHYSICAL EXAM
[Alert] : alert [Healthy Appearance] : healthy appearance [No Acute Distress] : no acute distress [Normal Voice/Communication] : normal voice communication [Normal Hearing] : hearing was normal [No Neck Mass] : no neck mass was observed [No LAD] : no lymphadenopathy [Thyroid Not Enlarged] : the thyroid was not enlarged [No Thyroid Nodules] : no palpable thyroid nodules [No Respiratory Distress] : no respiratory distress [Normal Rate and Effort] : normal respiratory rate and effort [Clear to Auscultation] : lungs were clear to auscultation bilaterally [Normal S1, S2] : normal S1 and S2 [Normal Rate] : heart rate was normal [Regular Rhythm] : with a regular rhythm [Normal Gait] : normal gait [Oriented x3] : oriented to person, place, and time [Normal Affect] : the affect was normal [Normal Insight/Judgement] : insight and judgment were intact [Normal Mood] : the mood was normal

## 2023-01-17 DIAGNOSIS — D35.2 BENIGN NEOPLASM OF PITUITARY GLAND: ICD-10-CM

## 2023-01-17 LAB
IGF-1 INTERP: NORMAL
IGF-I BLD-MCNC: 192 NG/ML
MONOMERIC PROLACTIN (ICMA)*: 20.2 NG/ML
PERCENT MACROPROLACTIN: 30 %
PROLACTIN SERPL-MCNC: 25.2 NG/ML
PROLACTIN, SERUM (ICMA)*: 28.9 NG/ML

## 2023-03-06 ENCOUNTER — NON-APPOINTMENT (OUTPATIENT)
Age: 33
End: 2023-03-06

## 2023-04-10 NOTE — DATA REVIEWED
[FreeTextEntry1] : 7/19/2021 MRI brain with/without contrast\par - see Radiology report for complete findings\par Impression: There is a 2mm nodule in the right side of the pituitary gland which may represent a pituitary microadenoma.  No acute disease.  Remainder of brain parenchyma is unremarkable\par \par \par \par 10/24/2022\par TSH 3.644, FT4 0.7, TT4 6.3, FT3 2.49, TT3 1, NEG TG AB and TPO AB\par PRL 18.5\par FSH 5, LH 2.5, progesterone <0.1\par Total testo 11.4, estradiol 90.1, SHBG 21.45\par ACTH 15.16, cortisol (PM) 7.3\par CBC WNL\par chol 244, HDL 48, , \par Gluc 91, creat 0.8, GFR 82, AST/ALT 22/21\par A1C 5.1%\par \par 12/15/2022\par Prolactin 47.3\par TSH 1.77, FT4 1.2

## 2023-04-10 NOTE — ADDENDUM
[FreeTextEntry1] : 1/17/2023, addendumDURAN\par Called and reviewed labs with patient\par PRL mildly elevated\par macroprolactin WNL\par IGF-1 WNL\par -- start cabergoline 0.5mg, 1/2 tablet twice weekly\par -- repeat PRL and TFTs in 4-6 weeks\par \par 4/10/23, addendumDURAN\par Labs from 3/29/23 discussed with patient via email\par PRL normalized to 6.5 with cabergoline 1/2 tablet twice weekly\par TFTs with FT4 slightly above mid normal on LT4 50mcg daily, continue (goal slightly above mid normal to upper normal)\par -- repeat labs in 6 months\par -- plan to repeat MRI pituitary in 6-12 months, close to 6 if still planning pregnancy in ~1 year

## 2023-04-10 NOTE — ASSESSMENT
[FreeTextEntry1] : 1-2. Hyperprolactinemia, pituitary adenoma\par Dx in 2021\par 7/2021 MRI Brain revealed 2mm pituitary microadenoma\par Labs repeated with past endo after cessation of breastfeeding in October 2022, PRL 18.5\par In 12/15/2022 PRL 47.3\par No current desire for pregnancy, but desired in ~1 year\par Currently has Kyleena IUD in place\par 10/2022 Labs reviewed all pituitary hormones other than IGF-1 and suggested central hypothyroidism with upper normal TSH and low FT4 (as below)\par -- labs today to include macroprolactin and IGF1\par -- if macroprolactin WNL, will start cabergoline regimen\par -- if starting cabergoline, plan to repeat labs ~4-6 weeks after initiation and to repeat MRI 6-12 months after being on cabergoline, prior to next pregnancy\par \par 3. Hypothyroidism\par Labs from 10/24/2022 with TSH 3.644, FT4 0.7, suggestive of central hypothyroidism and NEG Tg and TPO AB\par Was on LT4 50mcg prior to/during pregnancy, discontinued in postpartum period\par LT4 75mcg QAM started following October 2022 labs\par 12/15/2022 with TSH 1.77, FT4 1.2\par Stopped regimen x 3 weeks following these labs, at recommendation of PCP, restarted 2 weeks ago at OBGYN recommendation\par -- will repeat TSH and FT4 with repeat PRL in ~6 weeks\par \par Labs today, I dannielle lcall with results\par \par Bev Pitts, MS, FNP-BC, [facility]\par 01/09/2023

## 2023-04-10 NOTE — HISTORY OF PRESENT ILLNESS
[FreeTextEntry1] : Ms. OTTO is a 32 year female with pmhx of hyperprolactinemia, pituitary adenoma, hypothyroidism who presents for evaluation.\par \par OBGYN. Dr. Margarita Mays: [phone] OBGYN recommended endocrine evaluation\par Saw Kassandra Funk, endocrinology, [facility] Endocrinology, presents to establish care within our practice today\par OBGYN provided inaging and lab results, which were reviewed with patient today from  and \par \par On OCP for many years, then nuvaring, then IUD\par IUD removed 2021, was not ovulating for 6 months following removal and was experiencing menses every 2-3 weeks\par Labs performed in this setting revealing hyperprolactinemia, MRI following significant for pituitary microadenoma \par Ovulated 2021, got pregnant, delivered 3/30/2022, daughter More\par Pregnancy complicated by pre-eclampsia (developed at 36 weeks), delivered via  for breech\par Had initial OBGYN eval while pregnant and returned to [facility] in 2022, with repeat PRL WNL\par Then presented to OBGYN in 2022 with PRL elevated again in that setting\par \par Prior to pregnancy in , [facility] initiated LT4 50mcg, which was discontinued after pregnancy\par OBGYN placed on 75mcg of levothyroxine after October labs with low FT4\par PCP recommended cessation of this regimen in December, so she stopped x 3 weeks and resumed at recommendation of OBGYN 2 weeks ago\par Desires pregnancy in 1-1.5 years\par Prior to last pregnancy, was taking 50mcg in , stopped after delivery\par LMP last week, ended last thursday, IUD back in - Kyleena \par No galactorrhea\par \par 2-3 migraines annually.  None throughout pregnancy.  Now having headaches again, no migraine\par Denies vision change, sees ophthal, Dr Phill Ferris.  \par \par Endorses weight issues entire life.\par Not eating added sugar with +4lb weight loss in past week\par Since discontinuing breastfeeding, months of scale not moving\par Endorses h/o HLD whole life, +fhx of HLD

## 2023-08-22 NOTE — END OF VISIT
You must understand that you have received treatment at an Urgent Care facility only, and that you may be  released before all of your medical problems are known or treated. Urgent Care facilities are not equipped to  handle life threatening emergencies. It is recommended that you seek care at an Emergency Department for  further evaluation of worsening or concerning symptoms, or possibly life threatening conditions as  discussed.   [Time Spent: ___ minutes] : I have spent [unfilled] minutes of time on the encounter.

## 2024-04-02 DIAGNOSIS — E22.1 HYPERPROLACTINEMIA: ICD-10-CM

## 2024-04-02 DIAGNOSIS — E03.8 OTHER SPECIFIED HYPOTHYROIDISM: ICD-10-CM

## 2024-04-02 RX ORDER — LEVOTHYROXINE SODIUM 0.05 MG/1
50 TABLET ORAL DAILY
Qty: 90 | Refills: 1 | Status: ACTIVE | COMMUNITY
Start: 2023-02-21 | End: 1900-01-01

## 2024-04-03 RX ORDER — CABERGOLINE 0.5 MG/1
0.5 TABLET ORAL
Qty: 12 | Refills: 0 | Status: ACTIVE | COMMUNITY
Start: 2023-01-17 | End: 1900-01-01

## 2024-07-24 ENCOUNTER — APPOINTMENT (OUTPATIENT)
Dept: ENDOCRINOLOGY | Facility: CLINIC | Age: 34
End: 2024-07-24
Payer: COMMERCIAL

## 2024-07-24 VITALS
SYSTOLIC BLOOD PRESSURE: 125 MMHG | BODY MASS INDEX: 26.57 KG/M2 | HEART RATE: 67 BPM | WEIGHT: 150 LBS | DIASTOLIC BLOOD PRESSURE: 80 MMHG

## 2024-07-24 DIAGNOSIS — E03.8 OTHER SPECIFIED HYPOTHYROIDISM: ICD-10-CM

## 2024-07-24 DIAGNOSIS — E22.1 HYPERPROLACTINEMIA: ICD-10-CM

## 2024-07-24 DIAGNOSIS — D35.2 BENIGN NEOPLASM OF PITUITARY GLAND: ICD-10-CM

## 2024-07-24 PROCEDURE — 99213 OFFICE O/P EST LOW 20 MIN: CPT

## 2024-07-24 NOTE — ASSESSMENT
[FreeTextEntry1] : 1-2. Hyperprolactinemia, pituitary adenoma Dx in 2021 in setting of menstrual irregularity 7/2021 MRI Brain revealed 2mm pituitary microadenoma Labs repeated with past endo after cessation of breastfeeding in October 2022, PRL 18.5 In 12/15/2022 PRL 47.3 No current desire for pregnancy Currently has Kyleena IUD in place 10/2022 Labs reviewed all pituitary hormones other than IGF-1 and suggested central hypothyroidism with upper normal TSH and low FT4 (as below) 1/9/2023 PRL 25.2, monomeric PRL 20.2, IGF-1 WNL (192) Current regimen: cabergoline 0.5mg 1/2 tablet (0.25mg) twice weekly  Most recent labs from 7/11/24 with normal PRL and macroprolactin Asymptomatic, but has IUD.  Discussed consideration of trial off of cabergoline and repeat PRL/macroprolactin in 2 months vs repeat MRI.  Open to trial of discontinuation, if PRL rises, can repeat MRI at that time. -- stop cabergoline -- repeat labs in 2 months  3. Hypothyroidism Labs from 10/24/2022 with TSH 3.644, FT4 0.7, suggestive of central hypothyroidism and NEG Tg and TPO AB Was on LT4 50mcg prior to/during pregnancy, discontinued in postpartum period Current regimen: Levothyroxine 50mcg QAM Most recent TFTs, 7/11/24 with TSH 2.13, FT4 1.3 -- continue current regimen  Follow-up in 6-12 months, pending labs  Bev Pitts, MS, FNP-BC, [facility] 07/24/2024

## 2024-07-24 NOTE — DATA REVIEWED
[FreeTextEntry1] : 7/19/2021 MRI brain with/without contrast - see Radiology report for complete findings Impression: There is a 2mm nodule in the right side of the pituitary gland which may represent a pituitary microadenoma. No acute disease. Remainder of brain parenchyma is unremarkable

## 2024-07-24 NOTE — PHYSICAL EXAM
[Alert] : alert [Healthy Appearance] : healthy appearance [No Acute Distress] : no acute distress [Normal Voice/Communication] : normal voice communication [Normal Hearing] : hearing was normal [No Respiratory Distress] : no respiratory distress [Normal Rate and Effort] : normal respiratory rate and effort [Normal Gait] : normal gait [Oriented x3] : oriented to person, place, and time [Normal Affect] : the affect was normal [Normal Insight/Judgement] : insight and judgment were intact [Normal Mood] : the mood was normal

## 2024-07-24 NOTE — ASSESSMENT
[FreeTextEntry1] : 1-2. Hyperprolactinemia, pituitary adenoma Dx in 2021 in setting of menstrual irregularity 7/2021 MRI Brain revealed 2mm pituitary microadenoma Labs repeated with past endo after cessation of breastfeeding in October 2022, PRL 18.5 In 12/15/2022 PRL 47.3 No current desire for pregnancy Currently has Kyleena IUD in place 10/2022 Labs reviewed all pituitary hormones other than IGF-1 and suggested central hypothyroidism with upper normal TSH and low FT4 (as below) 1/9/2023 PRL 25.2, monomeric PRL 20.2, IGF-1 WNL (192) Current regimen: cabergoline 0.5mg 1/2 tablet (0.25mg) twice weekly  Most recent labs from 7/11/24 with normal PRL and macroprolactin Asymptomatic, but has IUD.  Discussed consideration of trial off of cabergoline and repeat PRL/macroprolactin in 2 months vs repeat MRI.  Open to trial of discontinuation, if PRL rises, can repeat MRI at that time. -- stop cabergoline -- repeat labs in 2 months  3. Hypothyroidism Labs from 10/24/2022 with TSH 3.644, FT4 0.7, suggestive of central hypothyroidism and NEG Tg and TPO AB Was on LT4 50mcg prior to/during pregnancy, discontinued in postpartum period Current regimen: Levothyroxine 50mcg QAM Most recent TFTs, 7/11/24 with TSH 2.13, FT4 1.3 -- continue current regimen  Follow-up in 6-12 months, pending labs  Bev Pitts, MS, FNP-BC, [facility] 07/24/2024

## 2024-07-24 NOTE — HISTORY OF PRESENT ILLNESS
[FreeTextEntry1] : Ms. OTTO is a 34 year female with pmhx of hyperprolactinemia, pituitary adenoma, hypothyroidism who presents for follow-up.  Referred to endocrin by OBGYN. Dr. Margarita Mays: [phone] OBGYN Saw Kassandra Funk, endocrinology, [facility] Endocrinology in the past OBGYN provided inaging and lab results, which were reviewed with patient today from  and   Last (initial) visit, 2023  Interval change: Since last visit, continued LT4 regimen and started cabergoline Recent labs from 24 reviewed with patient today with normalized PRL, macroprolactin and TFTs at goal Recently started repatha  with cards. Started seeing cards after pre-eclampsia.  Lipoprotein elevated  Current regimen: -- Levothyroxine 50 mcg QAM -- cabergoline 0.25mg (1/2 of 0.5mg tablet) twice weekly  1. Hyperprolactinemia, pituitary microadenoma On OCP for many years, then nuvaring, then IUD Endorses that IUD was removed 2021, was not ovulating for 6 months following removal and was experiencing menses every 2-3 weeks Labs performed in this setting revealing hyperprolactinemia, MRI following significant for pituitary microadenoma  Ovulated 2021, got pregnant, delivered 3/30/2022, daughter More Pregnancy complicated by pre-eclampsia (developed at 36 weeks), delivered via  for breech Had initial OBGYN eval while pregnant and returned to [facility] in 2022, with repeat PRL WNL Then presented to OBGYN in 2022 with PRL elevated again in that setting 2-3 migraines annually.   Denies vision change, sees ophthal, Dr Phill Ferris   2. Central hypothyroidism Prior to pregnancy in , [facility] initiated LT4 50mcg, which was discontinued after pregnancy OBGYN placed on 75mcg of levothyroxine after October labs with low FT4 PCP recommended cessation of this regimen in December, so she stopped x 3 weeks and resumed at recommendation of OBGYN 2 weeks ago No current desire for pregnancy No galactorrhea  3. Difficulty losing weight Endorses weight issues entire life. 19lb weight loss since last visit (2023) Endorses h/o HLD whole life, +fhx of HLD, started repathy recently

## 2024-07-24 NOTE — HISTORY OF PRESENT ILLNESS
[FreeTextEntry1] : Ms. OTTO is a 34 year female with pmhx of hyperprolactinemia, pituitary adenoma, hypothyroidism who presents for follow-up.  Referred to endocrin by OBGYN. Dr. Margarita Mays: 1060 OBGYN Saw Kassandra Funk, endocrinology, [facility] Salas Endocrinology in the past OBGYN provided inaging and lab results, which were reviewed with patient today from  and   Last (initial) visit, 2023  Interval change: Since last visit, continued LT4 regimen and started cabergoline Recent labs from 24 reviewed with patient today with normalized PRL, macroprolactin and TFTs at goal Recently started repatha  with cards. Started seeing cards after pre-eclampsia.  Lipoprotein elevated  Current regimen: -- Levothyroxine 50 mcg QAM -- cabergoline 0.25mg (1/2 of 0.5mg tablet) twice weekly  1. Hyperprolactinemia, pituitary microadenoma On OCP for many years, then nuvaring, then IUD Endorses that IUD was removed 2021, was not ovulating for 6 months following removal and was experiencing menses every 2-3 weeks Labs performed in this setting revealing hyperprolactinemia, MRI following significant for pituitary microadenoma  Ovulated 2021, got pregnant, delivered 3/30/2022, daughter More Pregnancy complicated by pre-eclampsia (developed at 36 weeks), delivered via  for breech Had initial OBGYN eval while pregnant and returned to [facility] in 2022, with repeat PRL WNL Then presented to OBGYN in 2022 with PRL elevated again in that setting 2-3 migraines annually.   Denies vision change, sees ophthal, Dr Phill Ferris   2. Central hypothyroidism Prior to pregnancy in , [facility] initiated LT4 50mcg, which was discontinued after pregnancy OBGYN placed on 75mcg of levothyroxine after October labs with low FT4 PCP recommended cessation of this regimen in December, so she stopped x 3 weeks and resumed at recommendation of OBGYN 2 weeks ago No current desire for pregnancy No galactorrhea  3. Difficulty losing weight Endorses weight issues entire life. 19lb weight loss since last visit (2023) Endorses h/o HLD whole life, +fhx of HLD, started repathy recently

## 2024-09-16 NOTE — ED PROVIDER NOTE - WET READ LAUNCH FT
Fatty liver, area of concern does not appear suspicious however will repeat in 6 months. Refer to gi king   There are no Wet Read(s) to document.

## 2024-12-06 DIAGNOSIS — E22.1 HYPERPROLACTINEMIA: ICD-10-CM

## 2024-12-06 DIAGNOSIS — E03.8 OTHER SPECIFIED HYPOTHYROIDISM: ICD-10-CM

## 2024-12-06 DIAGNOSIS — D35.2 BENIGN NEOPLASM OF PITUITARY GLAND: ICD-10-CM

## 2024-12-19 DIAGNOSIS — E03.8 OTHER SPECIFIED HYPOTHYROIDISM: ICD-10-CM

## 2024-12-19 DIAGNOSIS — R61 GENERALIZED HYPERHIDROSIS: ICD-10-CM

## 2025-01-30 ENCOUNTER — NON-APPOINTMENT (OUTPATIENT)
Age: 35
End: 2025-01-30

## 2025-07-31 ENCOUNTER — TRANSCRIPTION ENCOUNTER (OUTPATIENT)
Age: 35
End: 2025-07-31